# Patient Record
Sex: FEMALE | Race: WHITE | NOT HISPANIC OR LATINO | Employment: FULL TIME | URBAN - METROPOLITAN AREA
[De-identification: names, ages, dates, MRNs, and addresses within clinical notes are randomized per-mention and may not be internally consistent; named-entity substitution may affect disease eponyms.]

---

## 2017-01-06 ENCOUNTER — HOSPITAL ENCOUNTER (OUTPATIENT)
Dept: RADIOLOGY | Facility: HOSPITAL | Age: 57
Discharge: HOME | End: 2017-01-06
Attending: FAMILY MEDICINE
Payer: COMMERCIAL

## 2017-01-06 DIAGNOSIS — Z12.31 SCREENING MAMMOGRAM FOR HIGH-RISK PATIENT: ICD-10-CM

## 2017-01-06 PROCEDURE — G0202 SCR MAMMO BI INCL CAD: HCPCS

## 2020-11-09 ENCOUNTER — TRANSCRIBE ORDERS (OUTPATIENT)
Dept: ADMINISTRATIVE | Facility: HOSPITAL | Age: 60
End: 2020-11-09

## 2020-11-09 DIAGNOSIS — Q87.89 OSTEOPOROSIS AND OCULOCUTANEOUS HYPOPIGMENTATION SYNDROME: ICD-10-CM

## 2020-11-09 DIAGNOSIS — L81.8 OSTEOPOROSIS AND OCULOCUTANEOUS HYPOPIGMENTATION SYNDROME: ICD-10-CM

## 2020-11-09 DIAGNOSIS — Z12.31 ENCOUNTER FOR SCREENING MAMMOGRAM FOR MALIGNANT NEOPLASM OF BREAST: Primary | ICD-10-CM

## 2020-11-09 DIAGNOSIS — M81.8 OSTEOPOROSIS AND OCULOCUTANEOUS HYPOPIGMENTATION SYNDROME: ICD-10-CM

## 2021-01-04 ENCOUNTER — HOSPITAL ENCOUNTER (OUTPATIENT)
Dept: RADIOLOGY | Facility: HOSPITAL | Age: 61
Discharge: HOME | End: 2021-01-04
Payer: COMMERCIAL

## 2021-01-04 VITALS — BODY MASS INDEX: 27.32 KG/M2 | HEIGHT: 66 IN | WEIGHT: 170 LBS

## 2021-01-04 DIAGNOSIS — L81.8 OSTEOPOROSIS AND OCULOCUTANEOUS HYPOPIGMENTATION SYNDROME: ICD-10-CM

## 2021-01-04 DIAGNOSIS — M81.8 OSTEOPOROSIS AND OCULOCUTANEOUS HYPOPIGMENTATION SYNDROME: ICD-10-CM

## 2021-01-04 DIAGNOSIS — Z12.31 ENCOUNTER FOR SCREENING MAMMOGRAM FOR MALIGNANT NEOPLASM OF BREAST: ICD-10-CM

## 2021-01-04 DIAGNOSIS — Q87.89 OSTEOPOROSIS AND OCULOCUTANEOUS HYPOPIGMENTATION SYNDROME: ICD-10-CM

## 2021-01-04 PROCEDURE — 77063 BREAST TOMOSYNTHESIS BI: CPT

## 2021-01-04 PROCEDURE — 77080 DXA BONE DENSITY AXIAL: CPT

## 2021-01-04 PROCEDURE — 77067 SCR MAMMO BI INCL CAD: CPT

## 2021-03-30 DIAGNOSIS — Z23 ENCOUNTER FOR IMMUNIZATION: ICD-10-CM

## 2023-06-07 ENCOUNTER — HOSPITAL ENCOUNTER (EMERGENCY)
Facility: HOSPITAL | Age: 63
Discharge: HOME | End: 2023-06-07
Attending: EMERGENCY MEDICINE
Payer: COMMERCIAL

## 2023-06-07 VITALS
BODY MASS INDEX: 31.8 KG/M2 | RESPIRATION RATE: 20 BRPM | TEMPERATURE: 98.3 F | OXYGEN SATURATION: 98 % | HEART RATE: 74 BPM | SYSTOLIC BLOOD PRESSURE: 159 MMHG | DIASTOLIC BLOOD PRESSURE: 81 MMHG | WEIGHT: 197 LBS

## 2023-06-07 DIAGNOSIS — T26.91XA CHEMICAL BURN OF RIGHT EYE: Primary | ICD-10-CM

## 2023-06-07 RX ORDER — ERYTHROMYCIN 5 MG/G
OINTMENT OPHTHALMIC
Qty: 3.5 G | Refills: 0 | Status: SHIPPED | OUTPATIENT
Start: 2023-06-07

## 2023-06-07 RX ORDER — TETRACAINE HYDROCHLORIDE 5 MG/ML
2 SOLUTION OPHTHALMIC ONCE
Status: COMPLETED | OUTPATIENT
Start: 2023-06-07 | End: 2023-06-07

## 2023-06-07 RX ORDER — BUPROPION HYDROCHLORIDE 300 MG/1
300 TABLET ORAL DAILY
COMMUNITY
Start: 2023-04-14 | End: 2023-07-13

## 2023-06-07 RX ADMIN — FLUORESCEIN SODIUM 1 STRIP: 1 STRIP OPHTHALMIC at 08:06

## 2023-06-07 RX ADMIN — TETRACAINE HYDROCHLORIDE 2 DROP: 5 SOLUTION OPHTHALMIC at 08:06

## 2023-06-07 NOTE — ED PROVIDER NOTES
History  Chief Complaint   Patient presents with   • Eye Pain     Pt got glue in the r eye, pt's eye allergy medicine right next to the glue  Pt accidentally took the glue instead of eye medicine by accident     58-year-old female presenting today for evaluation of right side eye irritation that began this morning, states that she accidentally placed nail glue into her right eye  She thought this was her allergy medication  Immediately had stinging, was able to thoroughly irrigate her eye  She no longer has pain however does note a gritty sensation, able to open eyes spontaneously without difficulty no changes in vision  Patient has not worn her contact lenses in over a month  Denies double vision, blurry vision, nausea, vomiting  Differential includes but is not limited to chemical burn, contact allergy, corneal abrasion  Prior to Admission Medications   Prescriptions Last Dose Informant Patient Reported? Taking? Fexofenadine HCl (ALLEGRA PO)   Yes Yes   Sig: Take by mouth   buPROPion (WELLBUTRIN XL) 300 mg 24 hr tablet   Yes Yes   Sig: Take 300 mg by mouth daily      Facility-Administered Medications: None       Past Medical History:   Diagnosis Date   • Fibrocystic breast        Past Surgical History:   Procedure Laterality Date   • BREAST BIOPSY Left     benign - 1980 and 1990 (approx)  • BREAST CYST EXCISION Left        Family History   Problem Relation Age of Onset   • Colon cancer Father    • No Known Problems Sister    • Heart attack Maternal Grandmother    • Heart attack Brother      I have reviewed and agree with the history as documented  E-Cigarette/Vaping     E-Cigarette/Vaping Substances          Review of Systems   Constitutional: Negative  Negative for chills, fatigue and fever  HENT: Negative  Negative for congestion, postnasal drip, rhinorrhea and sore throat  Eyes: Positive for redness  Negative for photophobia, pain, discharge, itching and visual disturbance  Respiratory: Negative  Negative for cough, shortness of breath and wheezing  Cardiovascular: Negative  Gastrointestinal: Negative  Negative for abdominal pain, diarrhea, nausea and vomiting  Endocrine: Negative  Genitourinary: Negative  Musculoskeletal: Negative  Skin: Negative  Neurological: Negative  Hematological: Negative  Psychiatric/Behavioral: Negative  All other systems reviewed and are negative  Physical Exam  Physical Exam  Vitals and nursing note reviewed  Constitutional:       Appearance: Normal appearance  HENT:      Head: Normocephalic and atraumatic  Right Ear: External ear normal       Left Ear: External ear normal       Nose: Nose normal    Eyes:      Conjunctiva/sclera: Conjunctivae normal      Cardiovascular:      Rate and Rhythm: Normal rate  Pulses: Normal pulses  Pulmonary:      Effort: Pulmonary effort is normal    Abdominal:      General: There is no distension  Musculoskeletal:         General: No deformity  Normal range of motion  Cervical back: Normal range of motion  Skin:     General: Skin is warm and dry  Capillary Refill: Capillary refill takes less than 2 seconds  Findings: No rash  Neurological:      General: No focal deficit present  Mental Status: She is alert and oriented to person, place, and time  Mental status is at baseline  Psychiatric:         Mood and Affect: Mood normal          Behavior: Behavior normal          Thought Content:  Thought content normal          Judgment: Judgment normal          Vital Signs  ED Triage Vitals [06/07/23 0754]   Temperature Pulse Respirations Blood Pressure SpO2   98 3 °F (36 8 °C) 74 20 159/81 98 %      Temp Source Heart Rate Source Patient Position - Orthostatic VS BP Location FiO2 (%)   Tympanic Monitor -- Right arm --      Pain Score       --           Vitals:    06/07/23 0754   BP: 159/81   Pulse: 74         Visual Acuity  Visual Acuity    Flowsheet Row Most Recent Value   Visual acuity R eye is 20/30   Visual acuity Left eye is 20/25   Wearing corrective eyewear/lenses? Yes          ED Medications  Medications   fluorescein sodium sterile ophthalmic strip 1 strip (1 strip Right Eye Given 6/7/23 0806)   tetracaine 0 5 % ophthalmic solution 2 drop (2 drops Right Eye Given 6/7/23 0806)       Diagnostic Studies  Results Reviewed     None                 No orders to display              Procedures  Procedures         ED Course                                             Medical Decision Making  We will treat for chemical burn with erythromycin ointment  Informed patient to avoid using contact lenses for the time being and to follow-up with ophthalmology should the irritation persist      Patient is informed to return to the emergency department for worsening of symptoms and was given proper education regarding their diagnosis and symptoms  Otherwise the patient is informed to follow up with their ophtho for re-evaluation  The patient verbalizes understanding and agrees with above assessment and plan  All questions were answered  Chemical burn of right eye: acute illness or injury  Risk  Prescription drug management  Disposition  Final diagnoses:   Chemical burn of right eye     Time reflects when diagnosis was documented in both MDM as applicable and the Disposition within this note     Time User Action Codes Description Comment    6/7/2023  8:21 AM Tiburcio Gillette Add [F41 [id number]] Chemical burn of right eye       ED Disposition     ED Disposition   Discharge    Condition   Stable    Date/Time   Wed Jun 7, 2023  8:21 AM    Comment   Antonia Breen discharge to home/self care                 Follow-up Information     Follow up With Specialties Details Why Contact Info Additional [phone] Emergency Department Emergency Medicine Go to  If symptoms worsen, otherwise please follow up with your family doctor or your eye doctor [address] [address]  [address] [address] Emergency Department, [place], [place], [address]    Kaila Forman MD Ophthalmology Schedule an appointment as soon as possible for a visit  As needed, if symptoms persist  [id number]  [phone]             Discharge Medication List as of 6/7/2023  8:22 AM      START taking these medications    Details   erythromycin (ILOTYCIN) ophthalmic ointment Place a 1/2 inch ribbon of ointment into the right lower eyelid BID x 7 days, Normal         CONTINUE these medications which have NOT CHANGED    Details   buPROPion (WELLBUTRIN XL) 300 mg 24 hr tablet Take 300 mg by mouth daily, Starting Fri 4/14/2023, Until Thu 7/13/2023, Historical Med      Fexofenadine HCl (ALLEGRA PO) Take by mouth, Historical Med             No discharge procedures on file      PDMP Review     None          ED Provider  Electronically Signed by           Aleks Carrillo PA-C  06/07/23 7236

## 2023-06-07 NOTE — Clinical Note
Luz Sundar was seen and treated in our emergency department on 6/7/2023  Please allow limited screen use until 6/10 as needed due to chemical eye burn    Diagnosis: chemical burn of right eye    Copeland Able  may return to work on return date  She may return on this date: 06/07/2023         If you have any questions or concerns, please don't hesitate to call        Howard Nunez PA-C    ______________________________           _______________          _______________  Hospital Representative                              Date                                Time